# Patient Record
Sex: MALE | Race: WHITE | NOT HISPANIC OR LATINO | Employment: OTHER | URBAN - NONMETROPOLITAN AREA
[De-identification: names, ages, dates, MRNs, and addresses within clinical notes are randomized per-mention and may not be internally consistent; named-entity substitution may affect disease eponyms.]

---

## 2023-07-05 ENCOUNTER — TELEPHONE (OUTPATIENT)
Dept: PRIMARY CARE | Facility: CLINIC | Age: 73
End: 2023-07-05
Payer: MEDICARE

## 2023-07-05 NOTE — TELEPHONE ENCOUNTER
Patient son Satnam and Fabiola daughter in law  called in said that patient had surgery on last Thursday removed a portion of prostrate due to him not being able to urinate. Then went into the hospital last night.  He was passing blood clots into the depends because he could not get up to use the bath room and he was in the Hospital in [facility]. He is still in a lot pain and they sent him home with a catheter in him they are suppose to empty the bag every hour on the hour. He did  blood work but won't have results till Friday, but they are concerned about infection. They would like  your a opinion on this and a cb [phone]    Urologist Dr. North

## 2023-07-05 NOTE — TELEPHONE ENCOUNTER
Most of what they describe since he had surgery is relatively common and expected, though his symptoms sound significant.  He will likely need the catheter for a while, maybe even a few more weeks.    Whenever someone has a catheter, there is always a risk of infection.  Urine samples tested from the catheter are not helpful, because they will always have blood and white blood cells in it.  When it comes to infection, he needs to go more by how he feels, i.e. does he have fevers, chills, increasing body aches (essentially flulike symptoms).    Good hydration, minimum of 80 ounces of water daily is important.  His symptoms may not improve day today, more like week to week.  Urology will steer him through the rest of his recovery.

## 2023-07-17 NOTE — TELEPHONE ENCOUNTER
Patient requested an appointment due to lightheadedness since surgery. He has had labs and was mildly anemic, but not severe enough to expect it to cause the symptoms described. If is so lightheaded that he is passing out or nearly passing out or falling, should go to ED. If not as severe, recommend scheduling at next available with anyone and continuing to encourage him to drink plenty of fluids (60 oz. Or more per day).

## 2023-07-27 ENCOUNTER — APPOINTMENT (OUTPATIENT)
Dept: PRIMARY CARE | Facility: CLINIC | Age: 73
End: 2023-07-27
Payer: MEDICARE

## 2023-08-30 ENCOUNTER — OFFICE VISIT (OUTPATIENT)
Dept: PRIMARY CARE | Facility: CLINIC | Age: 73
End: 2023-08-30
Payer: MEDICARE

## 2023-08-30 VITALS
DIASTOLIC BLOOD PRESSURE: 57 MMHG | BODY MASS INDEX: 28.14 KG/M2 | HEART RATE: 60 BPM | SYSTOLIC BLOOD PRESSURE: 117 MMHG | RESPIRATION RATE: 16 BRPM | TEMPERATURE: 96.4 F | HEIGHT: 68 IN | WEIGHT: 185.7 LBS | OXYGEN SATURATION: 95 %

## 2023-08-30 DIAGNOSIS — N13.8 BENIGN PROSTATIC HYPERPLASIA WITH URINARY OBSTRUCTION: ICD-10-CM

## 2023-08-30 DIAGNOSIS — I10 PRIMARY HYPERTENSION: ICD-10-CM

## 2023-08-30 DIAGNOSIS — Z00.00 HEALTH CARE MAINTENANCE: Primary | ICD-10-CM

## 2023-08-30 DIAGNOSIS — G25.0 TREMOR, ESSENTIAL: ICD-10-CM

## 2023-08-30 DIAGNOSIS — N40.1 BENIGN PROSTATIC HYPERPLASIA WITH URINARY OBSTRUCTION: ICD-10-CM

## 2023-08-30 PROBLEM — K63.5 COLON POLYPS: Status: ACTIVE | Noted: 2023-08-30

## 2023-08-30 PROBLEM — M54.50 LOW BACK PAIN: Status: ACTIVE | Noted: 2023-08-30

## 2023-08-30 PROBLEM — M51.36 DEGENERATION OF LUMBAR INTERVERTEBRAL DISC: Status: ACTIVE | Noted: 2023-08-30

## 2023-08-30 PROBLEM — J01.90 ACUTE SINUSITIS: Status: ACTIVE | Noted: 2023-08-30

## 2023-08-30 PROBLEM — M10.9 GOUT: Status: ACTIVE | Noted: 2023-08-30

## 2023-08-30 PROBLEM — R73.9 BLOOD GLUCOSE ELEVATED: Status: ACTIVE | Noted: 2023-08-30

## 2023-08-30 PROBLEM — M51.369 DEGENERATION OF LUMBAR INTERVERTEBRAL DISC: Status: ACTIVE | Noted: 2023-08-30

## 2023-08-30 PROBLEM — K42.9 UMBILICAL HERNIA WITHOUT OBSTRUCTION OR GANGRENE: Status: ACTIVE | Noted: 2021-10-12

## 2023-08-30 PROBLEM — R30.0 DYSURIA: Status: ACTIVE | Noted: 2023-08-30

## 2023-08-30 PROBLEM — R35.1 NOCTURIA: Status: ACTIVE | Noted: 2023-05-02

## 2023-08-30 PROBLEM — L53.1 ERYTHEMA ANNULARE CENTRIFUGUM: Status: ACTIVE | Noted: 2023-08-30

## 2023-08-30 PROBLEM — R20.9 DISTURBANCE OF SKIN SENSATION: Status: ACTIVE | Noted: 2019-04-23

## 2023-08-30 PROBLEM — I95.9 HYPOTENSION: Status: ACTIVE | Noted: 2023-06-30

## 2023-08-30 PROBLEM — E53.8 VITAMIN B12 DEFICIENCY: Status: ACTIVE | Noted: 2023-08-30

## 2023-08-30 PROBLEM — G89.4 CHRONIC PAIN SYNDROME: Status: ACTIVE | Noted: 2023-08-30

## 2023-08-30 PROBLEM — M79.10 MUSCLE PAIN: Status: ACTIVE | Noted: 2023-08-30

## 2023-08-30 PROBLEM — N41.9 PROSTATITIS: Status: ACTIVE | Noted: 2023-08-30

## 2023-08-30 PROBLEM — R21 RASH AND OTHER NONSPECIFIC SKIN ERUPTION: Status: ACTIVE | Noted: 2020-06-23

## 2023-08-30 PROBLEM — L40.9 PSORIASIS: Status: ACTIVE | Noted: 2023-08-30

## 2023-08-30 PROBLEM — M47.817 LUMBOSACRAL SPONDYLOSIS WITHOUT MYELOPATHY: Status: ACTIVE | Noted: 2023-08-30

## 2023-08-30 PROBLEM — G62.9 PERIPHERAL NEUROPATHY: Status: ACTIVE | Noted: 2023-08-30

## 2023-08-30 PROBLEM — G57.61 MORTON'S NEUROMA OF RIGHT FOOT: Status: ACTIVE | Noted: 2023-08-30

## 2023-08-30 PROCEDURE — 1125F AMNT PAIN NOTED PAIN PRSNT: CPT | Performed by: FAMILY MEDICINE

## 2023-08-30 PROCEDURE — 99214 OFFICE O/P EST MOD 30 MIN: CPT | Performed by: FAMILY MEDICINE

## 2023-08-30 PROCEDURE — 3078F DIAST BP <80 MM HG: CPT | Performed by: FAMILY MEDICINE

## 2023-08-30 PROCEDURE — 1160F RVW MEDS BY RX/DR IN RCRD: CPT | Performed by: FAMILY MEDICINE

## 2023-08-30 PROCEDURE — 3074F SYST BP LT 130 MM HG: CPT | Performed by: FAMILY MEDICINE

## 2023-08-30 PROCEDURE — 1036F TOBACCO NON-USER: CPT | Performed by: FAMILY MEDICINE

## 2023-08-30 PROCEDURE — 1159F MED LIST DOCD IN RCRD: CPT | Performed by: FAMILY MEDICINE

## 2023-08-30 RX ORDER — CLONAZEPAM 1 MG/1
TABLET ORAL
COMMUNITY
Start: 2023-08-14 | End: 2024-03-24

## 2023-08-30 RX ORDER — SYRINGE WITH NEEDLE, 1 ML 25GX5/8"
SYRINGE, EMPTY DISPOSABLE MISCELLANEOUS
COMMUNITY
Start: 2023-08-17

## 2023-08-30 RX ORDER — PROPRANOLOL HYDROCHLORIDE 10 MG/1
TABLET ORAL
COMMUNITY
Start: 2023-08-14 | End: 2024-03-07 | Stop reason: SDUPTHER

## 2023-08-30 RX ORDER — AMLODIPINE AND VALSARTAN 5; 160 MG/1; MG/1
1 TABLET ORAL
COMMUNITY
Start: 2023-08-14 | End: 2024-03-07 | Stop reason: SDUPTHER

## 2023-08-30 RX ORDER — FLUTICASONE PROPIONATE 50 MCG
2 SPRAY, SUSPENSION (ML) NASAL DAILY
COMMUNITY
Start: 2015-10-03

## 2023-08-30 RX ORDER — FINASTERIDE 5 MG/1
5 TABLET, FILM COATED ORAL DAILY
COMMUNITY
End: 2023-08-31 | Stop reason: ALTCHOICE

## 2023-08-30 RX ORDER — CYANOCOBALAMIN 1000 UG/ML
1000 INJECTION, SOLUTION INTRAMUSCULAR; SUBCUTANEOUS
COMMUNITY
Start: 2023-08-17 | End: 2024-08-16

## 2023-08-30 ASSESSMENT — PAIN SCALES - GENERAL: PAINLEVEL: 2

## 2023-08-30 ASSESSMENT — PATIENT HEALTH QUESTIONNAIRE - PHQ9
1. LITTLE INTEREST OR PLEASURE IN DOING THINGS: NOT AT ALL
2. FEELING DOWN, DEPRESSED OR HOPELESS: NOT AT ALL
SUM OF ALL RESPONSES TO PHQ9 QUESTIONS 1 AND 2: 0

## 2023-08-30 NOTE — PROGRESS NOTES
"Subjective   Patient ID: Mario Levin is a 72 y.o. male who presents for Hypertension and Follow-up (Recovering from prostate surgery 8 weeks ago ).    HPI   Mr. Levin was seen today for a routine follow-up of his hypertension, for which he takes amlodipine/valsartan.  He also takes propranolol, more for his essential tremor.  He overall feels well, though has had a complicated course status post prostate surgery about 2 months ago.  He had excessive bleeding after surgery, was in the hospital for 2 days, needed [place] replaced within a couple days of discharge, due to retention.  He also had an infection, which slowed recovery.  He is now getting to the point where in general he feels better, still has discomfort depending on what he sits on, if he hits up in the road while driving the car, etc.  His urine stream is strong, he is now off of finasteride.  Last postvoid residual was normal.  Medication(s) are being taken and tolerated as prescribed, without concerns, list reconciled today.  There are no complaints of chest pain, shortness of breath, lower extremity edema, or exertional concerns    Review of Systems  The full, 10+ multi-organ review of systems, is within normal limits with the exception of what is noted above in HPI.  Objective   /57 (BP Location: Left arm, Patient Position: Sitting, BP Cuff Size: Adult)   Pulse 60   Temp 35.8 °C (96.4 °F) (Temporal)   Resp 16   Ht 1.715 m (5' 7.5\")   Wt 84.2 kg (185 lb 11.2 oz)   SpO2 95%   BMI 28.66 kg/m²     Physical Exam  Constitutional/General appearance: alert, oriented, well-appearing, in no distress  Head and face exam is normal  No scleral icterus or conjunctival erythema present  Hearing is grossly normal  Respiratory effort is normal, no dyspnea noted  Cortical function is normal  Mood, affect, are pleasant, appropriate, and interactive.  Insight is normal  Cardiac exam reveals a regular rate and rhythm, no murmurs, rubs or gallops present. "   Lungs are clear bilaterally.    No lower extremity edema present.    Assessment/Plan     Hypertension--- since today's blood pressures are at goal, I have recommended continuing the current treatment regimen, including medication as noted above, as well as a low salt, low-fat, high-fiber diet.  Exercise is to be continued as able and tolerated.  We will continue to follow the high blood pressure on an every six-month basis, and address additional needs should they arise.    Essential tremor, propranolol continues to help significantly.     Continue all other medications  Keep follow-up here as scheduled    **Portions of this medical record have been created using voice recognition software and may have minor errors which are inherent in voice recognition systems. It has not been fully edited for typographical or grammatical errors**

## 2023-10-19 DIAGNOSIS — J01.90 ACUTE NON-RECURRENT SINUSITIS, UNSPECIFIED LOCATION: Primary | ICD-10-CM

## 2023-10-20 RX ORDER — DOXYCYCLINE 100 MG/1
CAPSULE ORAL
Qty: 14 CAPSULE | Refills: 0 | Status: SHIPPED | OUTPATIENT
Start: 2023-10-20 | End: 2024-03-07 | Stop reason: WASHOUT

## 2023-10-25 RX ORDER — AMOXICILLIN AND CLAVULANATE POTASSIUM 875; 125 MG/1; MG/1
TABLET, FILM COATED ORAL
Qty: 14 TABLET | Refills: 0 | Status: SHIPPED | OUTPATIENT
Start: 2023-10-25 | End: 2024-03-07 | Stop reason: WASHOUT

## 2024-02-29 ENCOUNTER — APPOINTMENT (OUTPATIENT)
Dept: PRIMARY CARE | Facility: CLINIC | Age: 74
End: 2024-02-29
Payer: MEDICARE

## 2024-03-07 ENCOUNTER — OFFICE VISIT (OUTPATIENT)
Dept: PRIMARY CARE | Facility: CLINIC | Age: 74
End: 2024-03-07
Payer: MEDICARE

## 2024-03-07 VITALS
BODY MASS INDEX: 29.87 KG/M2 | WEIGHT: 193.6 LBS | SYSTOLIC BLOOD PRESSURE: 109 MMHG | OXYGEN SATURATION: 97 % | HEART RATE: 54 BPM | RESPIRATION RATE: 16 BRPM | DIASTOLIC BLOOD PRESSURE: 51 MMHG | TEMPERATURE: 97.9 F

## 2024-03-07 DIAGNOSIS — G25.0 TREMOR, ESSENTIAL: ICD-10-CM

## 2024-03-07 DIAGNOSIS — N40.1 BENIGN PROSTATIC HYPERPLASIA WITH URINARY OBSTRUCTION: ICD-10-CM

## 2024-03-07 DIAGNOSIS — Z12.5 SCREENING PSA (PROSTATE SPECIFIC ANTIGEN): ICD-10-CM

## 2024-03-07 DIAGNOSIS — I10 PRIMARY HYPERTENSION: ICD-10-CM

## 2024-03-07 DIAGNOSIS — R73.9 BLOOD GLUCOSE ELEVATED: ICD-10-CM

## 2024-03-07 DIAGNOSIS — G57.92 NEUROPATHY OF LEFT FOOT: ICD-10-CM

## 2024-03-07 DIAGNOSIS — Z00.00 MEDICARE ANNUAL WELLNESS VISIT, SUBSEQUENT: Primary | ICD-10-CM

## 2024-03-07 DIAGNOSIS — N13.8 BENIGN PROSTATIC HYPERPLASIA WITH URINARY OBSTRUCTION: ICD-10-CM

## 2024-03-07 PROCEDURE — 1170F FXNL STATUS ASSESSED: CPT | Performed by: FAMILY MEDICINE

## 2024-03-07 PROCEDURE — 1159F MED LIST DOCD IN RCRD: CPT | Performed by: FAMILY MEDICINE

## 2024-03-07 PROCEDURE — 99214 OFFICE O/P EST MOD 30 MIN: CPT | Performed by: FAMILY MEDICINE

## 2024-03-07 PROCEDURE — 1036F TOBACCO NON-USER: CPT | Performed by: FAMILY MEDICINE

## 2024-03-07 PROCEDURE — G0439 PPPS, SUBSEQ VISIT: HCPCS | Performed by: FAMILY MEDICINE

## 2024-03-07 PROCEDURE — 3074F SYST BP LT 130 MM HG: CPT | Performed by: FAMILY MEDICINE

## 2024-03-07 PROCEDURE — 3078F DIAST BP <80 MM HG: CPT | Performed by: FAMILY MEDICINE

## 2024-03-07 PROCEDURE — 1125F AMNT PAIN NOTED PAIN PRSNT: CPT | Performed by: FAMILY MEDICINE

## 2024-03-07 RX ORDER — MOXIFLOXACIN 5 MG/ML
SOLUTION/ DROPS OPHTHALMIC
COMMUNITY
End: 2024-03-07 | Stop reason: WASHOUT

## 2024-03-07 RX ORDER — AMLODIPINE AND VALSARTAN 5; 160 MG/1; MG/1
1 TABLET ORAL
Qty: 90 TABLET | Refills: 3 | Status: SHIPPED | OUTPATIENT
Start: 2024-03-07

## 2024-03-07 RX ORDER — PROPRANOLOL HYDROCHLORIDE 10 MG/1
TABLET ORAL
Qty: 180 TABLET | Refills: 3 | Status: SHIPPED | OUTPATIENT
Start: 2024-03-07

## 2024-03-07 RX ORDER — FINASTERIDE 5 MG/1
5 TABLET, FILM COATED ORAL DAILY
COMMUNITY
Start: 2024-01-02

## 2024-03-07 RX ORDER — KETOROLAC TROMETHAMINE 5 MG/ML
SOLUTION OPHTHALMIC
COMMUNITY
End: 2024-03-07 | Stop reason: WASHOUT

## 2024-03-07 ASSESSMENT — ACTIVITIES OF DAILY LIVING (ADL)
DRESSING: INDEPENDENT
MANAGING_FINANCES: INDEPENDENT
GROCERY_SHOPPING: INDEPENDENT
DOING_HOUSEWORK: INDEPENDENT
TAKING_MEDICATION: INDEPENDENT
BATHING: INDEPENDENT

## 2024-03-07 NOTE — PROGRESS NOTES
Subjective   Patient ID: Mario Levin is a 73 y.o. male who presents for Medicare Annual Wellness Visit Subsequent.    [facility]   Ed was seen today for a routine follow-up of his medications, including those for hypertension, tremor, BPH.  He overall feels well, just had his second cataract surgery, has recovered in the expected fashion.  Roughly 6 months ago he had prostate surgery (water vaporization), is currently doing well, weaning off of finasteride.  His postoperative course was complicated by excessive bleeding, was hospitalized for 2 days.  Most recent labs reviewed, reassuring.  He continues B12 injections per his neurologist at the clinic, for his left lateral foot neuropathy.  Ed's essential tremor has been well-controlled on propranolol for quite some time.    Cologuard 2022, due 2025  Flu, pneumo, shingrix are all up to date  No indication for AAA US  Review of Systems  The full review of systems is negative with the exception of what is noted in [facility]    Objective   /51 (BP Location: Right arm, Patient Position: Sitting, BP Cuff Size: Adult)   Pulse 54   Temp 36.6 °C (97.9 °F)   Resp 16   Wt 87.8 kg (193 lb 9.6 oz)   SpO2 97%   BMI 29.87 kg/m²     Physical Exam  Constitutional/General appearance: alert, oriented, well-appearing, in no distress  Head and face exam is normal  No scleral icterus or conjunctival erythema present  Hearing is grossly normal  Respiratory effort is normal, no dyspnea noted  Cortical function is normal  Mood, affect, are pleasant, appropriate, and interactive.  Insight is normal  Cardiac exam reveals a regular rate and rhythm, no murmurs, rubs or gallops present.   Lungs are clear bilaterally.    No lower extremity edema present.    Assessment/Plan     All Medicare gaps reviewed and are up-to-date.    Hypertension--- since today's blood pressures are at goal, I have recommended continuing the current treatment regimen, including medication as noted above, as well as a  low salt, low-fat, high-fiber diet.  Exercise is to be continued as able and tolerated.  We will continue to follow the high blood pressure on an every six-month basis, and address additional needs should they arise.    BPH care per Dr. Mike, weaning off of finasteride    Left foot peripheral neuropathy, managed by neurology, clonazepam as well.    Fasting labs when able.  Follow-up in 6 months  **Portions of this medical record have been created using voice recognition software and may have minor errors which are inherent in voice recognition systems. It has not been fully edited for typographical or grammatical errors**

## 2024-04-30 ENCOUNTER — LAB (OUTPATIENT)
Dept: LAB | Facility: LAB | Age: 74
End: 2024-04-30
Payer: MEDICARE

## 2024-04-30 DIAGNOSIS — G25.0 TREMOR, ESSENTIAL: ICD-10-CM

## 2024-04-30 DIAGNOSIS — R73.9 BLOOD GLUCOSE ELEVATED: ICD-10-CM

## 2024-04-30 DIAGNOSIS — G57.92 NEUROPATHY OF LEFT FOOT: ICD-10-CM

## 2024-04-30 DIAGNOSIS — I10 PRIMARY HYPERTENSION: ICD-10-CM

## 2024-04-30 DIAGNOSIS — Z12.5 SCREENING PSA (PROSTATE SPECIFIC ANTIGEN): ICD-10-CM

## 2024-04-30 PROCEDURE — 80061 LIPID PANEL: CPT

## 2024-04-30 PROCEDURE — 84443 ASSAY THYROID STIM HORMONE: CPT

## 2024-04-30 PROCEDURE — 36415 COLL VENOUS BLD VENIPUNCTURE: CPT

## 2024-04-30 PROCEDURE — 80053 COMPREHEN METABOLIC PANEL: CPT

## 2024-04-30 PROCEDURE — 83036 HEMOGLOBIN GLYCOSYLATED A1C: CPT

## 2024-04-30 PROCEDURE — 82607 VITAMIN B-12: CPT

## 2024-04-30 PROCEDURE — G0103 PSA SCREENING: HCPCS

## 2024-04-30 PROCEDURE — 85025 COMPLETE CBC W/AUTO DIFF WBC: CPT

## 2024-05-01 LAB
ALBUMIN SERPL BCP-MCNC: 4.7 G/DL (ref 3.4–5)
ALP SERPL-CCNC: 52 U/L (ref 33–136)
ALT SERPL W P-5'-P-CCNC: 27 U/L (ref 10–52)
ANION GAP SERPL CALC-SCNC: 13 MMOL/L (ref 10–20)
AST SERPL W P-5'-P-CCNC: 22 U/L (ref 9–39)
BASOPHILS # BLD AUTO: 0.14 X10*3/UL (ref 0–0.1)
BASOPHILS NFR BLD AUTO: 2 %
BILIRUB SERPL-MCNC: 0.8 MG/DL (ref 0–1.2)
BUN SERPL-MCNC: 20 MG/DL (ref 6–23)
CALCIUM SERPL-MCNC: 9.5 MG/DL (ref 8.6–10.6)
CHLORIDE SERPL-SCNC: 105 MMOL/L (ref 98–107)
CHOLEST SERPL-MCNC: 186 MG/DL (ref 0–199)
CHOLESTEROL/HDL RATIO: 4.3
CO2 SERPL-SCNC: 28 MMOL/L (ref 21–32)
CREAT SERPL-MCNC: 1 MG/DL (ref 0.5–1.3)
EGFRCR SERPLBLD CKD-EPI 2021: 79 ML/MIN/1.73M*2
EOSINOPHIL # BLD AUTO: 0.3 X10*3/UL (ref 0–0.4)
EOSINOPHIL NFR BLD AUTO: 4.2 %
ERYTHROCYTE [DISTWIDTH] IN BLOOD BY AUTOMATED COUNT: 13.8 % (ref 11.5–14.5)
EST. AVERAGE GLUCOSE BLD GHB EST-MCNC: 88 MG/DL
GLUCOSE SERPL-MCNC: 112 MG/DL (ref 74–99)
HBA1C MFR BLD: 4.7 %
HCT VFR BLD AUTO: 54.2 % (ref 41–52)
HDLC SERPL-MCNC: 43.3 MG/DL
HGB BLD-MCNC: 17.3 G/DL (ref 13.5–17.5)
IMM GRANULOCYTES # BLD AUTO: 0.03 X10*3/UL (ref 0–0.5)
IMM GRANULOCYTES NFR BLD AUTO: 0.4 % (ref 0–0.9)
LDLC SERPL CALC-MCNC: 120 MG/DL
LYMPHOCYTES # BLD AUTO: 1.75 X10*3/UL (ref 0.8–3)
LYMPHOCYTES NFR BLD AUTO: 24.7 %
MCH RBC QN AUTO: 31.2 PG (ref 26–34)
MCHC RBC AUTO-ENTMCNC: 31.9 G/DL (ref 32–36)
MCV RBC AUTO: 98 FL (ref 80–100)
MONOCYTES # BLD AUTO: 0.35 X10*3/UL (ref 0.05–0.8)
MONOCYTES NFR BLD AUTO: 4.9 %
NEUTROPHILS # BLD AUTO: 4.51 X10*3/UL (ref 1.6–5.5)
NEUTROPHILS NFR BLD AUTO: 63.8 %
NON HDL CHOLESTEROL: 143 MG/DL (ref 0–149)
NRBC BLD-RTO: 0 /100 WBCS (ref 0–0)
PLATELET # BLD AUTO: 199 X10*3/UL (ref 150–450)
POTASSIUM SERPL-SCNC: 5 MMOL/L (ref 3.5–5.3)
PROT SERPL-MCNC: 6.7 G/DL (ref 6.4–8.2)
PSA SERPL-MCNC: 3.3 NG/ML
RBC # BLD AUTO: 5.55 X10*6/UL (ref 4.5–5.9)
SODIUM SERPL-SCNC: 141 MMOL/L (ref 136–145)
TRIGL SERPL-MCNC: 113 MG/DL (ref 0–149)
TSH SERPL-ACNC: 1.29 MIU/L (ref 0.44–3.98)
VIT B12 SERPL-MCNC: 899 PG/ML (ref 211–911)
VLDL: 23 MG/DL (ref 0–40)
WBC # BLD AUTO: 7.1 X10*3/UL (ref 4.4–11.3)

## 2024-09-12 ENCOUNTER — APPOINTMENT (OUTPATIENT)
Dept: PRIMARY CARE | Facility: CLINIC | Age: 74
End: 2024-09-12
Payer: MEDICARE

## 2025-01-06 NOTE — PROGRESS NOTES
Subjective      HPI:    Mario Levin. Is 74 y.o.. male. Here for acute visit-     PCP is -         No chief complaint on file.        What concern/ problem/pain/symptom  brings you here today?      how long has pt had sxs?      describe symptoms-    fever-  nausea-  vomiting-      Are symptoms all day ?  Do symptoms occur at night?      has pt tried anything for current symptoms, including medications (OTC or prescription)  ?        what makes symptoms worse?      Does anything make symptoms better?      has pt been seen recently for this problem ( within past 2-3 weeks) ?     if yes- where?    by who?    what treatment was provided?      Social History     Tobacco Use   Smoking Status Former    Current packs/day: 2.00    Average packs/day: 2.0 packs/day for 10.0 years (20.0 ttl pk-yrs)    Types: Cigarettes, Pipe   Smokeless Tobacco Never       Social History     Substance and Sexual Activity   Alcohol Use Yes    Alcohol/week: 4.0 standard drinks of alcohol    Types: 4 Glasses of wine per week          Review of Systems       Objective            There were no vitals filed for this visit.        PHYSICAL:      CONSTITUTIONAL- NAD, Pt is A and O x3, Vital signs are within normal limits, well- hydrated, non-toxic   General Appearance- normal , good hygiene, talks easily  EYES- conjunctiva and lids- normal  EARS/NOSE-TM's normal, head normocephalic and atraumatic, nasopharynx-no nasal discharge, no trismus, no hot potato voice, oropharynx- normal  NECK- supple, FROM  LYMPH- no cervical lymph nodes palpated   CV- RRR without murmur  PULM- mild rhonchi, no wheezes bilaterally       Respiratory effort- normal respiratory effort , no retractions or nasal flaring   ABDOMEN- normoactive BS's , soft , NT, no hepatosplenomegaly palpated  EXTREMITIES- no edema or varicosities           SKIN- no abnormal skin lesions on inspection or palpation   NEURO- no focal deficits  PSYCH- pleasant, normal judgement and  insight            BP Readings from Last 3 Encounters:   03/07/24 109/51   10/09/23 129/80   08/30/23 117/57         Wt Readings from Last 3 Encounters:   03/07/24 87.8 kg (193 lb 9.6 oz)   10/09/23 86.2 kg (190 lb 0.6 oz)   08/30/23 84.2 kg (185 lb 11.2 oz)       BMI Readings from Last 3 Encounters:   03/07/24 29.87 kg/m²   10/09/23 29.32 kg/m²   08/30/23 28.66 kg/m²           The number and complexity of problems addressed is considered moderate.  The amount and/or complexity of data reviewed and analyzed is considered moderate. The risk of complications and/or morbidity/mortality of patient is considered moderate. Overall, this patient encounter is considered a moderate risk visit.      What are antibiotics?  Antibiotics are medicines that help people fight infections caused by bacteria. They work by killing bacteria that are in the body. These medicines come in many different forms, including pills, ointments, and liquids that are given by injection.    Antibiotics can do a lot of good. For people with serious bacterial infections, antibiotics can save lives. But people use them far too often, even when they're not needed. This is causing a very serious problem called antibiotic resistance. Antibiotic resistance happens when bacteria that have been exposed to an antibiotic change so that the antibiotic can no longer kill them. In those bacteria, the antibiotic has no effect. Because of this problem, doctors are having a harder and harder time treating infections. Experts worry that there will soon be infections that don't respond to any antibiotics.    When are antibiotics helpful?  Antibiotics can help people fight off infections caused by bacteria. They do not work on infections caused by viruses.    Some common bacterial infections that are treated with antibiotics include:    Strep throat    Pneumonia (an infection of the lungs)    Bladder infections    Infections you catch through sex, such as gonorrhea and  chlamydia    When are antibiotics NOT helpful?    Antibiotics do not work on infections caused by viruses.    Antibiotics are not helpful for the common cold, because the common cold is caused by a virus.    Antibiotics are not helpful for the flu, because the flu is caused by a virus. (People with the flu can be treated with medicines called antiviral medicines, which are different from antibiotics.)      Even though antibiotics don't work on infections caused by viruses, people sometimes believe that they do. That's because they took antibiotics for a viral infection before and then got better. The problem is that those people would have gotten better with or without an antibiotic. When they get better with the antibiotic, they think that's what cured them, when in reality the antibiotic had nothing to do with it.    What's the harm in taking antibiotics even if they might not help?  There are many reasons you should not take antibiotics unless you absolutely need them:    Antibiotics cause side effects, such as nausea, vomiting, and diarrhea. They can even make it more likely that you will get a different kind of infection, such as yeast infection (if you are a woman).    Allergies to antibiotics are common. You can develop an allergy to an antibiotic, even if you have not had a problem with it before. Some allergies are just unpleasant, causing rashes and itching. But some can be very serious and even life-threatening. It is better to avoid any risk of an allergy, if the antibiotic wouldn't help you anyway.    Overuse of antibiotics leads to antibiotic resistance. Using antibiotics when they are not needed gives bacteria a chance to change, so that the antibiotics cannot hurt them later on. People who have infections caused by antibiotic-resistant bacteria often have to be treated in the hospital with many different antibiotics. People can even die from these infections, because there is no antibiotic that will  "cure them.    When should I take antibiotics?  You should take antibiotics only when a doctor or nurse prescribes them to you. You should never take antibiotics prescribed to someone else, and you should not take antibiotics that were prescribed to you for a previous illness. When prescribing an antibiotic, doctors and nurses have to carefully pick the right antibiotic for a particular infection. Not all antibiotics work on all bacteria.    If you do have an infection caused by a bacteria, your doctor or nurse might want to find out what that bacteria is, and which antibiotics can kill it. They do this by taking a \"culture\" of the bacteria and growing it in the lab. But it's not possible to do a culture on someone who has already started an antibiotic. So if you start an antibiotic without input from a doctor or nurse it will be impossible to know if you have taken the right one.    What can I do to reduce antibiotic resistance?  Here are some things that can help:    Do not pressure your doctor or nurse for antibiotics when they do not think you need them.    If you are prescribed antibiotics, finish all of the medicine and take it exactly as directed. Never skip doses or stop taking the medicine without talking to your doctor or nurse.    Do not give antibiotics that were prescribed to you to anyone else.    What if my doctor prescribes an antibiotic that did not work for me before?  If an antibiotic did not work for you before, that does not mean it will never work for you. If you have used an antibiotic before and it did not work, tell your doctor. But keep in mind that the infection you had before might not have been caused by the same bacteria that you have now. The \"best\" antibiotic is the right one for the bacteria causing the infection, not for the person with the infection.    What if I am allergic to an antibiotic?  If you had a bad reaction to an antibiotic, tell your doctor or nurse about it. But do not " assume you are allergic unless your doctor or nurse tells you that you are.    Many people who think they are allergic to an antibiotic are wrong. If you get nauseous after taking an antibiotic, that does not mean you are allergic to it. Nausea is a common side effect of many antibiotics. If you are a woman and you get a yeast infection after taking an antibiotic, that does not mean you are allergic to it. Yeast infections are a common side effect of antibiotics.    Symptoms of antibiotic allergy can be mild and include a flat rash and itching. They can also be more serious and include:    Hives - Hives are raised, red patches of skin that are usually very itchy (picture 1).    Lip or tongue swelling    Trouble swallowing or breathing    Serious allergy symptoms can start right after you start taking an antibiotic if you are very sensitive. Less serious symptoms, on the other hand, often start a day or more later.    Almost all antibiotics may cause possible side effects of allergic reaction, nausea, vomiting, diarrhea- most worrisome caused by C. diff, and secondary yeast infection.        Assessment/Plan        Assessment & Plan  Cough, unspecified type                       Call if no better or if symptoms worsen        Time spent during the office visit includes-    updating and familiarizing myself with patients past medical history, social history, and allergies :  discussing ROS ;  obtaining direct  chief complaint and history of present illness from patient ,  reviewing and reconciling current medication list - both prescription and over the counter medications    clinically examine patient    determine what testing if any is needed to  diagnose the condition/conditions  with which patient is presenting    using medical knowledge to put all of the information together and decide on best course of action to proceed with diagnosis  and  treatment for the presenting problem or problems    Pre-visit planning,  chart review, and face-to-face encounter   Discussion of medications  Coordination with office staff for med adjustments   Final documentation of visit

## 2025-01-07 ENCOUNTER — APPOINTMENT (OUTPATIENT)
Dept: PRIMARY CARE | Facility: CLINIC | Age: 75
End: 2025-01-07
Payer: MEDICARE

## 2025-01-07 DIAGNOSIS — R05.9 COUGH, UNSPECIFIED TYPE: Primary | ICD-10-CM

## 2025-01-15 ENCOUNTER — APPOINTMENT (OUTPATIENT)
Dept: PRIMARY CARE | Facility: CLINIC | Age: 75
End: 2025-01-15
Payer: MEDICARE

## 2025-01-15 VITALS
DIASTOLIC BLOOD PRESSURE: 70 MMHG | SYSTOLIC BLOOD PRESSURE: 117 MMHG | BODY MASS INDEX: 29.86 KG/M2 | TEMPERATURE: 97.5 F | RESPIRATION RATE: 14 BRPM | OXYGEN SATURATION: 96 % | WEIGHT: 193.5 LBS | HEART RATE: 49 BPM

## 2025-01-15 DIAGNOSIS — I10 PRIMARY HYPERTENSION: ICD-10-CM

## 2025-01-15 DIAGNOSIS — R73.9 BLOOD GLUCOSE ELEVATED: ICD-10-CM

## 2025-01-15 DIAGNOSIS — G25.0 TREMOR, ESSENTIAL: ICD-10-CM

## 2025-01-15 DIAGNOSIS — G57.92 NEUROPATHY OF LEFT FOOT: Primary | ICD-10-CM

## 2025-01-15 DIAGNOSIS — M10.9 GOUT, UNSPECIFIED CAUSE, UNSPECIFIED CHRONICITY, UNSPECIFIED SITE: ICD-10-CM

## 2025-01-15 PROCEDURE — 99214 OFFICE O/P EST MOD 30 MIN: CPT | Performed by: FAMILY MEDICINE

## 2025-01-15 PROCEDURE — 1159F MED LIST DOCD IN RCRD: CPT | Performed by: FAMILY MEDICINE

## 2025-01-15 PROCEDURE — 3078F DIAST BP <80 MM HG: CPT | Performed by: FAMILY MEDICINE

## 2025-01-15 PROCEDURE — 3074F SYST BP LT 130 MM HG: CPT | Performed by: FAMILY MEDICINE

## 2025-01-15 PROCEDURE — 1036F TOBACCO NON-USER: CPT | Performed by: FAMILY MEDICINE

## 2025-01-15 RX ORDER — CLONAZEPAM 1 MG/1
TABLET ORAL
Status: CANCELLED | OUTPATIENT
Start: 2025-01-15

## 2025-01-15 RX ORDER — CYANOCOBALAMIN 1000 UG/ML
1000 INJECTION, SOLUTION INTRAMUSCULAR; SUBCUTANEOUS
COMMUNITY
Start: 2025-01-13

## 2025-01-15 RX ORDER — SYRINGE WITH NEEDLE, 1 ML 25GX5/8"
SYRINGE, EMPTY DISPOSABLE MISCELLANEOUS
Status: CANCELLED | OUTPATIENT
Start: 2025-01-15

## 2025-01-15 RX ORDER — AMLODIPINE AND VALSARTAN 5; 160 MG/1; MG/1
1 TABLET ORAL
Qty: 90 TABLET | Refills: 3 | Status: SHIPPED | OUTPATIENT
Start: 2025-01-15

## 2025-01-15 RX ORDER — CYANOCOBALAMIN 1000 UG/ML
1000 INJECTION, SOLUTION INTRAMUSCULAR; SUBCUTANEOUS
Qty: 1 ML | Status: CANCELLED | OUTPATIENT
Start: 2025-01-15

## 2025-01-15 RX ORDER — PROPRANOLOL HYDROCHLORIDE 10 MG/1
TABLET ORAL
Qty: 180 TABLET | Refills: 3 | Status: SHIPPED | OUTPATIENT
Start: 2025-01-15

## 2025-01-15 NOTE — PROGRESS NOTES
Subjective   Patient ID: Mario Levin is a 74 y.o. male who presents for Follow-up (Pt presents for 6 mth fuv - pt states no concerns./Pt had flu vaccine.).    [facility] [place]   Ed was seen today for a follow-up and review of his medications, specifically those for hypertension and essential tremor.  He is taking and tolerating his amlodipine/valsartan, propranolol as prescribed.  Blood pressures have been well-controlled, essential tremor likewise on his current regimen.  He is due for fasting labs, would like to forego PSA testing, given longstanding BPH, repeated biopsies, and prostate surgery.    Ed sees neurology annually, who prescribes nightly clonazepam and vitamin B12 injections.  He is uncertain if either are helping his neuropathy symptoms.  He has been on clonazepam for many years, has discussed tapering it, would like to come off of it, is aware he would need to taper it.  He otherwise has joint and muscular aches and pains, stiffness, is better with movement, walking.  He notes neck crepitus with lateral neck movements and extension.  Has known congenital cervical fusion of 1 level.  He has no radicular symptoms.  Vaccines are up-to-date  Review of Systems  The full, 10+ multi-organ review of systems, is within normal limits with the exception of what is noted above in [facility] [place].  Objective   /70 (BP Location: Right arm, Patient Position: Sitting, BP Cuff Size: Adult)   Pulse (!) 49   Temp 36.4 °C (97.5 °F) (Temporal)   Resp 14   Wt 87.8 kg (193 lb 8 oz)   SpO2 96%   BMI 29.86 kg/m²     Physical Exam  Constitutional/General appearance: alert, oriented, well-appearing, in no distress  Head and face exam is normal  No scleral icterus or conjunctival erythema present  Hearing is grossly normal  Respiratory effort is normal, no dyspnea noted  Cortical function is normal  Mood, affect, are pleasant, appropriate, and interactive.  Insight is normal  Cardiac exam reveals a regular rate and rhythm, no murmurs,  rubs or gallops present.   Lungs are clear bilaterally.    No lower extremity edema present.  No visible tremor present  Assessment/Plan   Hypertension--- since today's blood pressures are at goal, I have recommended continuing the current treatment regimen, including medication as noted above, as well as a low salt, low-fat, high-fiber diet.  Exercise is to be continued as able and tolerated.  We will continue to follow the high blood pressure on an every six-month basis, and address additional needs should they arise.    Essential tremor, well-controlled with current regimen of propranolol.    Chronic neuropathy symptoms, defer management of vitamin B12 injections and clonazepam to them, and would like to taper and eventually discontinue both.    Fasting labs ordered  Discussed RSV vaccine    Follow-up in 1 year    **Portions of this medical record have been created using voice recognition software and may have minor errors which are inherent in voice recognition systems. It has not been fully edited for typographical or grammatical errors**

## 2025-01-23 ENCOUNTER — OFFICE VISIT (OUTPATIENT)
Dept: PRIMARY CARE | Facility: CLINIC | Age: 75
End: 2025-01-23
Payer: MEDICARE

## 2025-01-23 VITALS
DIASTOLIC BLOOD PRESSURE: 74 MMHG | RESPIRATION RATE: 16 BRPM | TEMPERATURE: 98.2 F | HEART RATE: 47 BPM | OXYGEN SATURATION: 95 % | SYSTOLIC BLOOD PRESSURE: 124 MMHG

## 2025-01-23 DIAGNOSIS — H65.93 MIDDLE EAR EFFUSION, BILATERAL: Primary | ICD-10-CM

## 2025-01-23 PROCEDURE — 3078F DIAST BP <80 MM HG: CPT | Performed by: FAMILY MEDICINE

## 2025-01-23 PROCEDURE — 99213 OFFICE O/P EST LOW 20 MIN: CPT | Performed by: FAMILY MEDICINE

## 2025-01-23 PROCEDURE — 1159F MED LIST DOCD IN RCRD: CPT | Performed by: FAMILY MEDICINE

## 2025-01-23 PROCEDURE — 3074F SYST BP LT 130 MM HG: CPT | Performed by: FAMILY MEDICINE

## 2025-01-23 PROCEDURE — 1160F RVW MEDS BY RX/DR IN RCRD: CPT | Performed by: FAMILY MEDICINE

## 2025-01-23 PROCEDURE — 1036F TOBACCO NON-USER: CPT | Performed by: FAMILY MEDICINE

## 2025-01-23 RX ORDER — IPRATROPIUM BROMIDE 21 UG/1
2 SPRAY, METERED NASAL EVERY 12 HOURS
Qty: 30 ML | Refills: 0 | Status: SHIPPED | OUTPATIENT
Start: 2025-01-23 | End: 2025-01-24 | Stop reason: SINTOL

## 2025-01-23 ASSESSMENT — ENCOUNTER SYMPTOMS: RHINORRHEA: 1

## 2025-01-23 NOTE — PROGRESS NOTES
Subjective   Patient ID: Mario Levin is a 74 y.o. male who presents for Ear Fullness (Was seen at  2 weeks ago, given augmentin, issue resolved and started back 3 days ago. ).    Earache   There is pain in both ears. This is a new problem. The current episode started in the past 7 days. The problem occurs constantly. The problem has been waxing and waning. There has been no fever. The pain is at a severity of 3/10. Associated symptoms include hearing loss and rhinorrhea.   Location: [facility] walk in clinic  Date: 1/6/2025  Sent home with augmentin BID x 7 days  3 days ago he develop ear fullness bilaterally. All other symptoms have resolved. He tried the nasal spray only once, but stopped it because he woke up groggy. He reports he has ear pressure decreased hearing, but no pain. He has been taking allegra and using flonase, he does still have some nasal drainage. Denies fevers, cough, dizziness.     Review of Systems   HENT:  Positive for ear pain, hearing loss, postnasal drip and rhinorrhea.      Objective   /74 (BP Location: Left arm, Patient Position: Sitting, BP Cuff Size: Large adult)   Pulse (!) 47   Temp 36.8 °C (98.2 °F) (Temporal)   Resp 16   SpO2 95%     Physical Exam  Constitutional: Well developed, well nourished, alert and in no acute distress.  Head and Face: NC/AT  Eyes: Normal external exam.   ENT: External inspection of ears normal, tympanic membranes visualized and moderate clear effusions present bilaterally without erythema. Nasal mucosa and turbinates swollen and erythematous, clear nasal discharge present. Oral mucosa moist, oropharynx clear without tonsillar exudate or erythema.   Neck: Supple. No cervical lymphadenopathy   Cardiovascular: Regular rate and rhythm, normal S1 and S2, no murmurs, gallops, or rubs.   Pulmonary: No respiratory distress, lungs clear to auscultation bilaterally. No wheezes, rhonchi, rales.  Skin: Warm, well perfused, normal skin turgor and color.    Neurologic: Cranial nerves II-XII grossly intact.    Assessment/Plan   START Atrovent nasal spray as directed: instill 1-2 sprays in each nostril 2x/day for the first 7 days, then reduce to once a day the next 2-3 weeks.     Stop flonase nasal spray    Consider using a cool mist humidifier at night time and OTC AYR gel in nose to prevent dryness or nosebleeds

## 2025-01-24 ENCOUNTER — TELEPHONE (OUTPATIENT)
Dept: PRIMARY CARE | Facility: CLINIC | Age: 75
End: 2025-01-24
Payer: MEDICARE

## 2025-01-24 DIAGNOSIS — J31.0 CHRONIC RHINITIS: Primary | ICD-10-CM

## 2025-01-24 RX ORDER — AZELASTINE HCL 205.5 UG/1
SPRAY NASAL
Qty: 30 ML | Refills: 3 | Status: SHIPPED | OUTPATIENT
Start: 2025-01-24

## 2025-01-24 RX ORDER — AZELASTINE HCL 205.5 UG/1
30 SPRAY NASAL 2 TIMES DAILY
Qty: 30 ML | Refills: 3 | Status: SHIPPED | OUTPATIENT
Start: 2025-01-24 | End: 2025-01-24 | Stop reason: SDUPTHER

## 2025-01-24 NOTE — TELEPHONE ENCOUNTER
Marian from [facility] pharm called the Azelastine it needs to say 1 -2 puffs 2 times a day.

## 2025-01-28 ENCOUNTER — APPOINTMENT (OUTPATIENT)
Dept: PRIMARY CARE | Facility: CLINIC | Age: 75
End: 2025-01-28
Payer: MEDICARE

## 2025-02-05 PROBLEM — J01.90 ACUTE SINUSITIS: Status: RESOLVED | Noted: 2023-08-30 | Resolved: 2025-02-05

## 2025-02-05 PROBLEM — R21 RASH AND OTHER NONSPECIFIC SKIN ERUPTION: Status: RESOLVED | Noted: 2020-06-23 | Resolved: 2025-02-05

## 2025-02-06 ENCOUNTER — APPOINTMENT (OUTPATIENT)
Dept: ALLERGY | Facility: CLINIC | Age: 75
End: 2025-02-06
Payer: MEDICARE

## 2025-02-06 VITALS
SYSTOLIC BLOOD PRESSURE: 118 MMHG | HEART RATE: 70 BPM | WEIGHT: 194 LBS | OXYGEN SATURATION: 97 % | DIASTOLIC BLOOD PRESSURE: 60 MMHG | BODY MASS INDEX: 29.94 KG/M2

## 2025-02-06 DIAGNOSIS — J31.0 CHRONIC RHINITIS: ICD-10-CM

## 2025-02-06 DIAGNOSIS — J30.0 VASOMOTOR RHINITIS: Primary | ICD-10-CM

## 2025-02-06 DIAGNOSIS — J32.9 CHRONIC CONGESTION OF PARANASAL SINUS: ICD-10-CM

## 2025-02-06 PROBLEM — J30.9 ALLERGIC RHINITIS: Status: ACTIVE | Noted: 2025-02-06

## 2025-02-06 RX ORDER — LOSARTAN POTASSIUM 50 MG/1
50 TABLET ORAL DAILY
COMMUNITY
End: 2025-02-11 | Stop reason: WASHOUT

## 2025-02-06 RX ORDER — AZELASTINE 1 MG/ML
2 SPRAY, METERED NASAL 2 TIMES DAILY
Qty: 30 ML | Refills: 5 | Status: SHIPPED | OUTPATIENT
Start: 2025-02-06 | End: 2026-02-06

## 2025-02-06 NOTE — PROGRESS NOTES
Subjective   Patient ID:   [id number]   aMrio Levin is a 74 y.o. male who presents for Allergy Testing, Allergic Rhinitis, Cough, and Sinus Problem (NPV ).    Chief Complaint   Patient presents with    Allergy Testing    Allergic Rhinitis    Cough    Sinus Problem     NPV      This is a new patient, with H/O BPH, hypotension, presenting for evaluation of allergy symptoms.    Patient reports longstanding allergy Sx with worsening, year-round sinus congestion and headaches.  He had sinusitis a month ago and took Augmentin, but still feels fluid in his left ear.  He denies ear pressure but endorses hearing loss and tinnitus.  He denies snoring or anosmia.  He typically gets an infection yearly.  He is currently taking ipratropium for 1.5 weeks.  He states it helped but it caused drowsiness.  He was allergy tested 30 years ago and told he was allergic to grass.  He had dogs in the past, but does not have any currently.      Patient had prednisone several times but it makes him fatigued.  Patient only has heartburn after eating onions and hamburgers that he had last night.  He cannot eat salt and had gout in the past.  He does not eat shellfish, eats rare red meat and avoids spicy foods.    Review of Systems   HENT:  Positive for congestion, hearing loss and tinnitus. Negative for ear pain.         Feels fluid in his left ear.   Gastrointestinal:         Intermittent heartburn, controlled by diet.     Objective   /60   Pulse 70   Wt 88 kg (194 lb)   SpO2 97%   BMI 29.94 kg/m²      Physical Exam  Constitutional:       Appearance: Normal appearance.   HENT:      Head: Normocephalic and atraumatic.      Right Ear: Tympanic membrane and external ear normal. There is no impacted cerumen.      Left Ear: External ear normal. There is no impacted cerumen.      Ears:      Comments: Fluid behind left TM.     Nose: Congestion (bilateral nasal turbinate edema) present. No rhinorrhea.   Eyes:      Extraocular Movements:  Extraocular movements intact.      Conjunctiva/sclera: Conjunctivae normal.      Pupils: Pupils are equal, round, and reactive to light.   Cardiovascular:      Rate and Rhythm: Normal rate and regular rhythm.      Heart sounds: No murmur heard.     No friction rub. No gallop.   Pulmonary:      Effort: No respiratory distress.      Breath sounds: No wheezing, rhonchi or rales.   Skin:     General: Skin is warm and dry.   Neurological:      Mental Status: He is alert.   Psychiatric:         Mood and Affect: Mood normal.         Behavior: Behavior normal.     Allergy testing was performed on Edward J Ollie using standard technique. There were no immediate complications.    Test Results  Panel 1  1.   Histamine: 5 x 5  2.   Saline - Diluent: 0  3.   Cockroach: 0  4.   Cotton Linters: 0  5.   Cat: 0  6.   Do  7.   D. Farinae: 0  8.   D. Pter: 0  9.   Feather: 0  10. Alternaria: 0  Tree Panel  1.   Manny: 0  2.   Beech: 0  3.   Birch: 0  4.   [place]: 0  5.   Silver Maple: 0  6.   Hickory: 0  7.   Maple: 0  8.   Oak: 0  9.   [place]: 0  10. [place]: 0  Grass/Misc Tree  1.   [place]: 0  2.   [place] Blue: 0  3.   Sage: 0  4.   Darnell: 0  5.   Orchard: 0  6.   Red Top: 0  7.   Rye Grass: 0  8.   Sweet Vernal: 0  9.   [employer]: 0  10. Waka: 0  Weeds  1.   Cocklebur: 0  2.   Common Mugwort: 0  3.   English Plantain: 0  4.   Hemp: 0  5.   Goldenrod: 0  6.   Kochia: 0  7.   Lamb's Quarter: 0  8.   Boyce Elder: 0  9.   Pigweed: 0  10. Ragweed: 0  Molds  1.   Aspergillus [place]: 0  2.   Aureobasid: 0  3.   Bipolaris: 0  4.   Cladosporidium: 0  5.   Epicoccum: 0  6.   Fusarium: 0  7.   Geotrichum: 0  8.   Helminthosporium: 0  9.   Penicillium: 0  10. Phoma: 0    Intradermal allergy testing was performed on Edward J Ollie using standard technique. There were no immediate complications.    Test Results  Controls  Intradermal Saline: 0  ID  Cat: 0  Cockroach: 0  Common Weed Mix: 0  Cotton: 0  Do  Feather: 0  KORT  "Mix: 0  Mite Mix: 0  Mold Mix #1: 0  Mold Mix #2: 0  Ragweed: 0  Tree Mix: 0    Skin testing is negative.    Assessment/Plan     Vasomotor rhinitis  He was tested >30 years ago and found to be reactive to grasses.  He C/O worsening, year-round congestion and headaches.  He denies snoring or anosmia.  He is improving from sinusitis a month ago and feels fluid in his left ear and hearing loss, but admits he has tinnitus.  He take ipratropium which helps.    Skin testing is negative.    He will switch ipratropium to azelastine.  Reviewed proper use instructions.    Vasomotor Rhinitis is chronic rhinitis that is characterized by intermittent (coming and going) episodes of sneezing, watery nasal drainage (rhinorrhea), and blood vessel congestion of the nasal mucus membranes. There appears to be a hypersensitive response to stimuli such as a dry atmosphere, air pollutants, spicy foods, alcohol, strong emotions, and some medications. Indeed, any particulate matter in the air, including pollens, dust, mold, or animal dander can bother people with VMR, even though they are not actually allergic to these things.      People with VMR are unusually sensitive to irritation and will have significant nasal symptoms, even when exposed to low concentrations of irritants. Thus, vasomotor rhinitis seems to be an exaggeration of the normal nasal response to irritation, occurring at levels of exposure, which doesn't bother most people.      Subjects with vasomotor rhinitis fall into two general groups: \"runners\" who have \"wet\" rhinorrhea, and \"dry\" subjects with predominant symptoms of nasal congestion and blockage to airflow and minimal rhinorrhea. These reactions can be provoked by non-specific irritant stimuli such as cold dry air, perfumes, paint fumes, and cigarette smoke. Subjects with predominantly rhinorrhea (sometimes referred to as cholinergic rhinitis) appear to have enhanced cholinergic glandular secretory activity, since " atropine effectively reduces their secretions.      It is important to understand that VMR is a nonspecific response to virtually any change or impurity in the air, as opposed to allergic rhinitis (or hay fever), which involves a response to a specific protein in pollen, dust, mold, or animal dander.      By signing my name below, I, Alhaji Nugent, attest that this documentation has been prepared under the direction and in the presence of Terri Black MD.  All medical record entries made by the Scribe were at my direction and personally dictated by me. I have reviewed the chart and agree that the record accurately reflects my personal performance of the history, physical exam, discussion and plan.

## 2025-02-06 NOTE — ASSESSMENT & PLAN NOTE
"He was tested >30 years ago and found to be reactive to grasses.  He C/O worsening, year-round congestion and headaches.  He denies snoring or anosmia.  He is improving from sinusitis a month ago and feels fluid in his left ear and hearing loss, but admits he has tinnitus.  He take ipratropium which helps.    Skin testing is negative.    He will switch ipratropium to azelastine.  Reviewed proper use instructions.    Vasomotor Rhinitis is chronic rhinitis that is characterized by intermittent (coming and going) episodes of sneezing, watery nasal drainage (rhinorrhea), and blood vessel congestion of the nasal mucus membranes. There appears to be a hypersensitive response to stimuli such as a dry atmosphere, air pollutants, spicy foods, alcohol, strong emotions, and some medications. Indeed, any particulate matter in the air, including pollens, dust, mold, or animal dander can bother people with VMR, even though they are not actually allergic to these things.      People with VMR are unusually sensitive to irritation and will have significant nasal symptoms, even when exposed to low concentrations of irritants. Thus, vasomotor rhinitis seems to be an exaggeration of the normal nasal response to irritation, occurring at levels of exposure, which doesn't bother most people.      Subjects with vasomotor rhinitis fall into two general groups: \"runners\" who have \"wet\" rhinorrhea, and \"dry\" subjects with predominant symptoms of nasal congestion and blockage to airflow and minimal rhinorrhea. These reactions can be provoked by non-specific irritant stimuli such as cold dry air, perfumes, paint fumes, and cigarette smoke. Subjects with predominantly rhinorrhea (sometimes referred to as cholinergic rhinitis) appear to have enhanced cholinergic glandular secretory activity, since atropine effectively reduces their secretions.      It is important to understand that VMR is a nonspecific response to virtually any change or " impurity in the air, as opposed to allergic rhinitis (or hay fever), which involves a response to a specific protein in pollen, dust, mold, or animal dander.

## 2025-02-12 ENCOUNTER — HOSPITAL ENCOUNTER (OUTPATIENT)
Dept: RADIOLOGY | Facility: CLINIC | Age: 75
Discharge: HOME | End: 2025-02-12
Payer: MEDICARE

## 2025-02-12 DIAGNOSIS — J32.9 CHRONIC CONGESTION OF PARANASAL SINUS: ICD-10-CM

## 2025-02-12 PROCEDURE — 70486 CT MAXILLOFACIAL W/O DYE: CPT

## 2025-02-27 DIAGNOSIS — H65.93 MIDDLE EAR EFFUSION, BILATERAL: Primary | ICD-10-CM

## 2025-03-05 ENCOUNTER — APPOINTMENT (OUTPATIENT)
Dept: OTOLARYNGOLOGY | Facility: CLINIC | Age: 75
End: 2025-03-05
Payer: MEDICARE

## 2025-03-29 LAB
ALBUMIN SERPL-MCNC: 4.6 G/DL (ref 3.6–5.1)
ALP SERPL-CCNC: 59 U/L (ref 35–144)
ALT SERPL-CCNC: 33 U/L (ref 9–46)
ANION GAP SERPL CALCULATED.4IONS-SCNC: 9 MMOL/L (CALC) (ref 7–17)
AST SERPL-CCNC: 22 U/L (ref 10–35)
BASOPHILS # BLD AUTO: 162 CELLS/UL (ref 0–200)
BASOPHILS NFR BLD AUTO: 1.8 %
BILIRUB SERPL-MCNC: 1.1 MG/DL (ref 0.2–1.2)
BUN SERPL-MCNC: 21 MG/DL (ref 7–25)
CALCIUM SERPL-MCNC: 9.2 MG/DL (ref 8.6–10.3)
CHLORIDE SERPL-SCNC: 102 MMOL/L (ref 98–110)
CHOLEST SERPL-MCNC: 210 MG/DL
CHOLEST/HDLC SERPL: 4.6 (CALC)
CO2 SERPL-SCNC: 28 MMOL/L (ref 20–32)
CREAT SERPL-MCNC: 0.91 MG/DL (ref 0.7–1.28)
EGFRCR SERPLBLD CKD-EPI 2021: 88 ML/MIN/1.73M2
EOSINOPHIL # BLD AUTO: 306 CELLS/UL (ref 15–500)
EOSINOPHIL NFR BLD AUTO: 3.4 %
ERYTHROCYTE [DISTWIDTH] IN BLOOD BY AUTOMATED COUNT: 13.4 % (ref 11–15)
EST. AVERAGE GLUCOSE BLD GHB EST-MCNC: 97 MG/DL
EST. AVERAGE GLUCOSE BLD GHB EST-SCNC: 5.4 MMOL/L
GLUCOSE SERPL-MCNC: 110 MG/DL (ref 65–99)
HBA1C MFR BLD: 5 % OF TOTAL HGB
HCT VFR BLD AUTO: 51.3 % (ref 38.5–50)
HDLC SERPL-MCNC: 46 MG/DL
HGB BLD-MCNC: 17.2 G/DL (ref 13.2–17.1)
LDLC SERPL CALC-MCNC: 139 MG/DL (CALC)
LYMPHOCYTES # BLD AUTO: 1485 CELLS/UL (ref 850–3900)
LYMPHOCYTES NFR BLD AUTO: 16.5 %
MCH RBC QN AUTO: 32.1 PG (ref 27–33)
MCHC RBC AUTO-ENTMCNC: 33.5 G/DL (ref 32–36)
MCV RBC AUTO: 95.9 FL (ref 80–100)
MONOCYTES # BLD AUTO: 423 CELLS/UL (ref 200–950)
MONOCYTES NFR BLD AUTO: 4.7 %
NEUTROPHILS # BLD AUTO: 6624 CELLS/UL (ref 1500–7800)
NEUTROPHILS NFR BLD AUTO: 73.6 %
NONHDLC SERPL-MCNC: 164 MG/DL (CALC)
PLATELET # BLD AUTO: 195 THOUSAND/UL (ref 140–400)
PMV BLD REES-ECKER: 10.5 FL (ref 7.5–12.5)
POTASSIUM SERPL-SCNC: 4.5 MMOL/L (ref 3.5–5.3)
PROT SERPL-MCNC: 6.6 G/DL (ref 6.1–8.1)
RBC # BLD AUTO: 5.35 MILLION/UL (ref 4.2–5.8)
SODIUM SERPL-SCNC: 139 MMOL/L (ref 135–146)
TRIGL SERPL-MCNC: 125 MG/DL
TSH SERPL-ACNC: 2.18 MIU/L (ref 0.4–4.5)
URATE SERPL-MCNC: 7.6 MG/DL (ref 4–8)
VIT B12 SERPL-MCNC: 526 PG/ML (ref 200–1100)
WBC # BLD AUTO: 9 THOUSAND/UL (ref 3.8–10.8)

## 2026-01-15 ENCOUNTER — APPOINTMENT (OUTPATIENT)
Dept: PRIMARY CARE | Facility: CLINIC | Age: 76
End: 2026-01-15
Payer: MEDICARE